# Patient Record
Sex: FEMALE | Race: WHITE | Employment: OTHER | URBAN - METROPOLITAN AREA
[De-identification: names, ages, dates, MRNs, and addresses within clinical notes are randomized per-mention and may not be internally consistent; named-entity substitution may affect disease eponyms.]

---

## 2017-04-19 ENCOUNTER — HOSPITAL ENCOUNTER (OUTPATIENT)
Dept: CT IMAGING | Age: 77
Discharge: HOME | End: 2017-04-19
Attending: NURSE PRACTITIONER | Admitting: NURSE PRACTITIONER

## 2017-04-19 DIAGNOSIS — C82.08 FOLLICULAR LYMPHOMA GRADE I OF LYMPH NODES OF MULTIPLE SITES (HCC): ICD-10-CM

## 2017-04-19 LAB
A/G RATIO: 1.2 (ref 1.1–2.2)
ALBUMIN SERPL-MCNC: 4 G/DL (ref 3.4–5)
ALP BLD-CCNC: 148 U/L (ref 40–129)
ALT SERPL-CCNC: 17 U/L (ref 10–40)
ANION GAP SERPL CALCULATED.3IONS-SCNC: 16 MMOL/L (ref 3–16)
AST SERPL-CCNC: 18 U/L (ref 15–37)
BASOPHILS ABSOLUTE: 0 K/UL (ref 0–0.2)
BASOPHILS RELATIVE PERCENT: 0.4 %
BILIRUB SERPL-MCNC: 0.3 MG/DL (ref 0–1)
BUN BLDV-MCNC: 31 MG/DL (ref 7–20)
CALCIUM SERPL-MCNC: 9.2 MG/DL (ref 8.3–10.6)
CHLORIDE BLD-SCNC: 101 MMOL/L (ref 99–110)
CO2: 26 MMOL/L (ref 21–32)
CREAT SERPL-MCNC: 1.3 MG/DL (ref 0.6–1.2)
EOSINOPHILS ABSOLUTE: 1.4 K/UL (ref 0–0.6)
EOSINOPHILS RELATIVE PERCENT: 16.8 %
GFR AFRICAN AMERICAN: 48
GFR NON-AFRICAN AMERICAN: 40
GLOBULIN: 3.4 G/DL
GLUCOSE BLD-MCNC: 63 MG/DL (ref 70–99)
HCT VFR BLD CALC: 37.2 % (ref 36–48)
HEMOGLOBIN: 12.3 G/DL (ref 12–16)
LACTATE DEHYDROGENASE: 168 U/L (ref 100–190)
LYMPHOCYTES ABSOLUTE: 1.7 K/UL (ref 1–5.1)
LYMPHOCYTES RELATIVE PERCENT: 21.4 %
MCH RBC QN AUTO: 30 PG (ref 26–34)
MCHC RBC AUTO-ENTMCNC: 33.1 G/DL (ref 31–36)
MCV RBC AUTO: 90.5 FL (ref 80–100)
MONOCYTES ABSOLUTE: 0.8 K/UL (ref 0–1.3)
MONOCYTES RELATIVE PERCENT: 10 %
NEUTROPHILS ABSOLUTE: 4.2 K/UL (ref 1.7–7.7)
NEUTROPHILS RELATIVE PERCENT: 51.4 %
PDW BLD-RTO: 15.2 % (ref 12.4–15.4)
PLATELET # BLD: 196 K/UL (ref 135–450)
PMV BLD AUTO: 8.6 FL (ref 5–10.5)
POTASSIUM SERPL-SCNC: 4.4 MMOL/L (ref 3.5–5.1)
RBC # BLD: 4.11 M/UL (ref 4–5.2)
SODIUM BLD-SCNC: 143 MMOL/L (ref 136–145)
TOTAL PROTEIN: 7.4 G/DL (ref 6.4–8.2)
WBC # BLD: 8.1 K/UL (ref 4–11)

## 2017-10-20 ENCOUNTER — HOSPITAL ENCOUNTER (OUTPATIENT)
Dept: CT IMAGING | Age: 77
Discharge: HOME | End: 2017-10-20
Attending: INTERNAL MEDICINE | Admitting: INTERNAL MEDICINE

## 2017-10-20 DIAGNOSIS — C82.08 FOLLICULAR LYMPHOMA GRADE I OF LYMPH NODES OF MULTIPLE SITES (HCC): ICD-10-CM

## 2017-10-20 LAB
CREAT SERPL-MCNC: 1.2 MG/DL (ref 0.6–1.2)
GFR AFRICAN AMERICAN: 52
GFR NON-AFRICAN AMERICAN: 43

## 2018-05-04 ENCOUNTER — HOSPITAL ENCOUNTER (OUTPATIENT)
Dept: CT IMAGING | Age: 78
Discharge: HOME | End: 2018-05-04
Attending: NURSE PRACTITIONER | Admitting: NURSE PRACTITIONER

## 2018-05-04 DIAGNOSIS — C82.00 FOLLICULAR LYMPHOMA GRADE I, UNSPECIFIED BODY REGION (HCC): ICD-10-CM

## 2018-05-04 LAB
BUN BLDV-MCNC: 22 MG/DL (ref 7–20)
CREAT SERPL-MCNC: 1.5 MG/DL (ref 0.6–1.2)
GFR AFRICAN AMERICAN: 40
GFR NON-AFRICAN AMERICAN: 33

## 2018-12-06 ENCOUNTER — HOSPITAL ENCOUNTER (OUTPATIENT)
Dept: CT IMAGING | Age: 78
Discharge: HOME | End: 2018-12-06
Payer: MEDICARE

## 2018-12-06 DIAGNOSIS — C82.00 FOLLICULAR LYMPHOMA GRADE I, UNSPECIFIED BODY REGION (HCC): ICD-10-CM

## 2018-12-06 LAB
A/G RATIO: 1.2 (ref 1.1–2.2)
ALBUMIN SERPL-MCNC: 3.8 G/DL (ref 3.4–5)
ALP BLD-CCNC: 167 U/L (ref 40–129)
ALT SERPL-CCNC: 23 U/L (ref 10–40)
ANION GAP SERPL CALCULATED.3IONS-SCNC: 10 MMOL/L (ref 3–16)
AST SERPL-CCNC: 24 U/L (ref 15–37)
BILIRUB SERPL-MCNC: 0.3 MG/DL (ref 0–1)
BUN BLDV-MCNC: 24 MG/DL (ref 7–20)
CALCIUM SERPL-MCNC: 9.1 MG/DL (ref 8.3–10.6)
CHLORIDE BLD-SCNC: 104 MMOL/L (ref 99–110)
CO2: 27 MMOL/L (ref 21–32)
CREAT SERPL-MCNC: 1.2 MG/DL (ref 0.6–1.2)
GFR AFRICAN AMERICAN: 52
GFR NON-AFRICAN AMERICAN: 43
GLOBULIN: 3.1 G/DL
GLUCOSE BLD-MCNC: 98 MG/DL (ref 70–99)
POTASSIUM SERPL-SCNC: 3.9 MMOL/L (ref 3.5–5.1)
SODIUM BLD-SCNC: 141 MMOL/L (ref 136–145)
TOTAL PROTEIN: 6.9 G/DL (ref 6.4–8.2)

## 2018-12-06 PROCEDURE — 71260 CT THORAX DX C+: CPT

## 2018-12-06 PROCEDURE — 36415 COLL VENOUS BLD VENIPUNCTURE: CPT

## 2018-12-06 PROCEDURE — 74177 CT ABD & PELVIS W/CONTRAST: CPT

## 2018-12-06 PROCEDURE — 80053 COMPREHEN METABOLIC PANEL: CPT

## 2018-12-06 PROCEDURE — 6360000004 HC RX CONTRAST MEDICATION: Performed by: INTERNAL MEDICINE

## 2018-12-06 RX ADMIN — IOPAMIDOL 75 ML: 755 INJECTION, SOLUTION INTRAVENOUS at 16:52

## 2019-03-02 ENCOUNTER — HOSPITAL ENCOUNTER (EMERGENCY)
Age: 79
Discharge: HOME | End: 2019-03-02
Attending: EMERGENCY MEDICINE
Payer: MEDICARE

## 2019-03-02 VITALS
SYSTOLIC BLOOD PRESSURE: 171 MMHG | RESPIRATION RATE: 16 BRPM | BODY MASS INDEX: 34.01 KG/M2 | DIASTOLIC BLOOD PRESSURE: 69 MMHG | OXYGEN SATURATION: 97 % | HEART RATE: 60 BPM | TEMPERATURE: 98 F | HEIGHT: 63 IN

## 2019-03-02 DIAGNOSIS — I10 ESSENTIAL HYPERTENSION: Primary | ICD-10-CM

## 2019-03-02 LAB
A/G RATIO: 1.1 (ref 1.1–2.2)
ALBUMIN SERPL-MCNC: 4 G/DL (ref 3.4–5)
ALP BLD-CCNC: 186 U/L (ref 40–129)
ALT SERPL-CCNC: 20 U/L (ref 10–40)
ANION GAP SERPL CALCULATED.3IONS-SCNC: 11 MMOL/L (ref 3–16)
AST SERPL-CCNC: 21 U/L (ref 15–37)
BACTERIA: ABNORMAL /HPF
BASOPHILS ABSOLUTE: 0.1 K/UL (ref 0–0.2)
BASOPHILS RELATIVE PERCENT: 0.9 %
BILIRUB SERPL-MCNC: 0.3 MG/DL (ref 0–1)
BILIRUBIN URINE: NEGATIVE
BLOOD, URINE: NEGATIVE
BUN BLDV-MCNC: 29 MG/DL (ref 7–20)
CALCIUM SERPL-MCNC: 8.9 MG/DL (ref 8.3–10.6)
CHLORIDE BLD-SCNC: 99 MMOL/L (ref 99–110)
CLARITY: CLEAR
CO2: 29 MMOL/L (ref 21–32)
COLOR: ABNORMAL
CREAT SERPL-MCNC: 1.5 MG/DL (ref 0.6–1.2)
EOSINOPHILS ABSOLUTE: 1.4 K/UL (ref 0–0.6)
EOSINOPHILS RELATIVE PERCENT: 18.6 %
EPITHELIAL CELLS, UA: ABNORMAL /HPF
GFR AFRICAN AMERICAN: 40
GFR NON-AFRICAN AMERICAN: 33
GLOBULIN: 3.5 G/DL
GLUCOSE BLD-MCNC: 238 MG/DL (ref 70–99)
GLUCOSE URINE: NEGATIVE MG/DL
HCT VFR BLD CALC: 36.7 % (ref 36–48)
HEMOGLOBIN: 12.1 G/DL (ref 12–16)
KETONES, URINE: NEGATIVE MG/DL
LEUKOCYTE ESTERASE, URINE: ABNORMAL
LYMPHOCYTES ABSOLUTE: 1.7 K/UL (ref 1–5.1)
LYMPHOCYTES RELATIVE PERCENT: 22.2 %
MCH RBC QN AUTO: 28.8 PG (ref 26–34)
MCHC RBC AUTO-ENTMCNC: 32.9 G/DL (ref 31–36)
MCV RBC AUTO: 87.4 FL (ref 80–100)
MICROSCOPIC EXAMINATION: YES
MONOCYTES ABSOLUTE: 1 K/UL (ref 0–1.3)
MONOCYTES RELATIVE PERCENT: 12.4 %
NEUTROPHILS ABSOLUTE: 3.5 K/UL (ref 1.7–7.7)
NEUTROPHILS RELATIVE PERCENT: 45.9 %
NITRITE, URINE: NEGATIVE
PDW BLD-RTO: 15 % (ref 12.4–15.4)
PH UA: 7 (ref 5–8)
PLATELET # BLD: 151 K/UL (ref 135–450)
PMV BLD AUTO: 9.4 FL (ref 5–10.5)
POTASSIUM REFLEX MAGNESIUM: 4.4 MMOL/L (ref 3.5–5.1)
PROTEIN UA: ABNORMAL MG/DL
RBC # BLD: 4.2 M/UL (ref 4–5.2)
RBC UA: ABNORMAL /HPF (ref 0–2)
RENAL EPITHELIAL, UA: ABNORMAL /HPF
SODIUM BLD-SCNC: 139 MMOL/L (ref 136–145)
SPECIFIC GRAVITY UA: <=1.005 (ref 1–1.03)
TOTAL PROTEIN: 7.5 G/DL (ref 6.4–8.2)
URINE REFLEX TO CULTURE: YES
URINE TYPE: ABNORMAL
UROBILINOGEN, URINE: 0.2 E.U./DL
WBC # BLD: 7.7 K/UL (ref 4–11)
WBC UA: ABNORMAL /HPF (ref 0–5)

## 2019-03-02 PROCEDURE — 80053 COMPREHEN METABOLIC PANEL: CPT

## 2019-03-02 PROCEDURE — 93005 ELECTROCARDIOGRAM TRACING: CPT | Performed by: PHYSICIAN ASSISTANT

## 2019-03-02 PROCEDURE — 99283 EMERGENCY DEPT VISIT LOW MDM: CPT

## 2019-03-02 PROCEDURE — 87086 URINE CULTURE/COLONY COUNT: CPT

## 2019-03-02 PROCEDURE — 6360000002 HC RX W HCPCS: Performed by: PHYSICIAN ASSISTANT

## 2019-03-02 PROCEDURE — 85025 COMPLETE CBC W/AUTO DIFF WBC: CPT

## 2019-03-02 PROCEDURE — 96374 THER/PROPH/DIAG INJ IV PUSH: CPT

## 2019-03-02 PROCEDURE — 81001 URINALYSIS AUTO W/SCOPE: CPT

## 2019-03-02 RX ORDER — HYDRALAZINE HYDROCHLORIDE 20 MG/ML
5 INJECTION INTRAMUSCULAR; INTRAVENOUS ONCE
Status: COMPLETED | OUTPATIENT
Start: 2019-03-02 | End: 2019-03-02

## 2019-03-02 RX ADMIN — HYDRALAZINE HYDROCHLORIDE 5 MG: 20 INJECTION INTRAMUSCULAR; INTRAVENOUS at 15:49

## 2019-03-02 ASSESSMENT — ENCOUNTER SYMPTOMS
BACK PAIN: 0
EYE REDNESS: 0
EYE PAIN: 0
ABDOMINAL PAIN: 0
COUGH: 0
CONSTIPATION: 0
RHINORRHEA: 0
SHORTNESS OF BREATH: 0
NAUSEA: 0
DIARRHEA: 0
FACIAL SWELLING: 0
CHEST TIGHTNESS: 0
SORE THROAT: 0

## 2019-03-03 LAB
EKG ATRIAL RATE: 64 BPM
EKG DIAGNOSIS: NORMAL
EKG P AXIS: 37 DEGREES
EKG P-R INTERVAL: 178 MS
EKG Q-T INTERVAL: 446 MS
EKG QRS DURATION: 138 MS
EKG QTC CALCULATION (BAZETT): 460 MS
EKG R AXIS: 10 DEGREES
EKG T AXIS: 77 DEGREES
EKG VENTRICULAR RATE: 64 BPM
URINE CULTURE, ROUTINE: NORMAL

## 2019-03-03 PROCEDURE — 93010 ELECTROCARDIOGRAM REPORT: CPT | Performed by: INTERNAL MEDICINE

## 2019-11-16 ENCOUNTER — HOSPITAL ENCOUNTER (OUTPATIENT)
Dept: CT IMAGING | Age: 79
Discharge: HOME | End: 2019-11-16
Payer: MEDICARE

## 2019-11-16 ENCOUNTER — HOSPITAL ENCOUNTER (OUTPATIENT)
Age: 79
Discharge: HOME | End: 2019-11-16
Payer: MEDICARE

## 2019-11-16 DIAGNOSIS — C82.08 FOLLICULAR LYMPHOMA GRADE I OF LYMPH NODES OF MULTIPLE SITES (HCC): ICD-10-CM

## 2019-11-16 LAB
BUN BLDV-MCNC: 27 MG/DL (ref 7–20)
CREAT SERPL-MCNC: 1.4 MG/DL (ref 0.6–1.2)
GFR AFRICAN AMERICAN: 44
GFR NON-AFRICAN AMERICAN: 36

## 2019-11-16 PROCEDURE — 6360000004 HC RX CONTRAST MEDICATION: Performed by: INTERNAL MEDICINE

## 2019-11-16 PROCEDURE — 36415 COLL VENOUS BLD VENIPUNCTURE: CPT

## 2019-11-16 PROCEDURE — 74176 CT ABD & PELVIS W/O CONTRAST: CPT

## 2019-11-16 PROCEDURE — 84520 ASSAY OF UREA NITROGEN: CPT

## 2019-11-16 PROCEDURE — 82565 ASSAY OF CREATININE: CPT

## 2019-11-16 RX ADMIN — IOHEXOL 50 ML: 240 INJECTION, SOLUTION INTRATHECAL; INTRAVASCULAR; INTRAVENOUS; ORAL at 07:50

## 2020-01-01 ENCOUNTER — HOSPITAL ENCOUNTER (OUTPATIENT)
Dept: CT IMAGING | Age: 80
Discharge: HOME | End: 2020-07-17
Payer: MEDICARE

## 2020-01-01 ENCOUNTER — HOSPITAL ENCOUNTER (EMERGENCY)
Age: 80
End: 2020-12-29
Attending: STUDENT IN AN ORGANIZED HEALTH CARE EDUCATION/TRAINING PROGRAM
Payer: MEDICARE

## 2020-01-01 ENCOUNTER — HOSPITAL ENCOUNTER (OUTPATIENT)
Age: 80
Discharge: HOME | End: 2020-07-31
Payer: MEDICARE

## 2020-01-01 ENCOUNTER — APPOINTMENT (OUTPATIENT)
Dept: GENERAL RADIOLOGY | Age: 80
End: 2020-01-01
Payer: MEDICARE

## 2020-01-01 ENCOUNTER — HOSPITAL ENCOUNTER (OUTPATIENT)
Age: 80
Discharge: HOME | End: 2020-07-17
Payer: MEDICARE

## 2020-01-01 ENCOUNTER — HOSPITAL ENCOUNTER (OUTPATIENT)
Dept: CT IMAGING | Age: 80
Discharge: HOME | End: 2020-04-16
Payer: MEDICARE

## 2020-01-01 VITALS
OXYGEN SATURATION: 78 % | RESPIRATION RATE: 17 BRPM | HEART RATE: 93 BPM | WEIGHT: 220 LBS | SYSTOLIC BLOOD PRESSURE: 138 MMHG | DIASTOLIC BLOOD PRESSURE: 59 MMHG

## 2020-01-01 LAB
A/G RATIO: 1.2 (ref 1.1–2.2)
ALBUMIN SERPL-MCNC: 2.9 G/DL (ref 3.4–5)
ALBUMIN SERPL-MCNC: 3.4 G/DL (ref 3.1–4.9)
ALBUMIN SERPL-MCNC: 4 G/DL (ref 3.4–5)
ALP BLD-CCNC: 188 U/L (ref 40–129)
ALPHA-1-GLOBULIN: 0.3 G/DL (ref 0.2–0.4)
ALPHA-2-GLOBULIN: 1.1 G/DL (ref 0.4–1.1)
ALT SERPL-CCNC: 71 U/L (ref 10–40)
ANION GAP SERPL CALCULATED.3IONS-SCNC: 16 MMOL/L (ref 3–16)
ANION GAP SERPL CALCULATED.3IONS-SCNC: 17 MMOL/L (ref 3–16)
ANISOCYTOSIS: ABNORMAL
ANTI-NUCLEAR ANTIBODY (ANA): NEGATIVE
AST SERPL-CCNC: 138 U/L (ref 15–37)
ATYPICAL LYMPHOCYTE RELATIVE PERCENT: 14 % (ref 0–6)
BANDED NEUTROPHILS RELATIVE PERCENT: 3 % (ref 0–7)
BASE EXCESS VENOUS: -22.6 MMOL/L (ref -3–3)
BASOPHILS ABSOLUTE: 0.2 K/UL (ref 0–0.2)
BASOPHILS RELATIVE PERCENT: 1 %
BETA GLOBULIN: 1 G/DL (ref 0.9–1.6)
BILIRUB SERPL-MCNC: <0.2 MG/DL (ref 0–1)
BUN BLDV-MCNC: 24 MG/DL (ref 7–20)
BUN BLDV-MCNC: 31 MG/DL (ref 7–20)
BUN BLDV-MCNC: 36 MG/DL (ref 7–20)
C3 COMPLEMENT: 141.6 MG/DL (ref 90–180)
C4 COMPLEMENT: 28.8 MG/DL (ref 10–40)
CALCIUM SERPL-MCNC: 9.2 MG/DL (ref 8.3–10.6)
CALCIUM SERPL-MCNC: 9.9 MG/DL (ref 8.3–10.6)
CARBOXYHEMOGLOBIN: 0.8 % (ref 0–1.5)
CHLORIDE BLD-SCNC: 98 MMOL/L (ref 99–110)
CHLORIDE BLD-SCNC: 98 MMOL/L (ref 99–110)
CO2: 20 MMOL/L (ref 21–32)
CO2: 25 MMOL/L (ref 21–32)
CREAT SERPL-MCNC: 1.6 MG/DL (ref 0.6–1.2)
CREAT SERPL-MCNC: 1.8 MG/DL (ref 0.6–1.2)
CREAT SERPL-MCNC: 1.8 MG/DL (ref 0.6–1.2)
D DIMER: 3669 NG/ML DDU (ref 0–229)
EOSINOPHILS ABSOLUTE: 0.7 K/UL (ref 0–0.6)
EOSINOPHILS RELATIVE PERCENT: 4 %
GAMMA GLOBULIN: 1.3 G/DL (ref 0.6–1.8)
GFR AFRICAN AMERICAN: 33
GFR AFRICAN AMERICAN: 33
GFR AFRICAN AMERICAN: 37
GFR AFRICAN AMERICAN: 37
GFR NON-AFRICAN AMERICAN: 27
GFR NON-AFRICAN AMERICAN: 27
GFR NON-AFRICAN AMERICAN: 31
GFR NON-AFRICAN AMERICAN: 31
GLOBULIN: 2.4 G/DL
GLUCOSE BLD-MCNC: 191 MG/DL (ref 70–99)
GLUCOSE BLD-MCNC: 439 MG/DL (ref 70–99)
HCO3 VENOUS: 13.2 MMOL/L (ref 23–29)
HCT VFR BLD CALC: 40.9 % (ref 36–48)
HEMATOLOGY PATH CONSULT: YES
HEMOGLOBIN: 12.4 G/DL (ref 12–16)
KAPPA, FREE LIGHT CHAINS, SERUM: 69.61 MG/L (ref 3.3–19.4)
KAPPA/LAMBDA RATIO: 1.39 (ref 0.26–1.65)
KAPPA/LAMBDA TEST COMMENT: ABNORMAL
LAMBDA, FREE LIGHT CHAINS, SERUM: 50.18 MG/L (ref 5.71–26.3)
LYMPHOCYTES ABSOLUTE: 12.7 K/UL (ref 1–5.1)
LYMPHOCYTES RELATIVE PERCENT: 56 %
MCH RBC QN AUTO: 29.2 PG (ref 26–34)
MCHC RBC AUTO-ENTMCNC: 30.2 G/DL (ref 31–36)
MCV RBC AUTO: 96.7 FL (ref 80–100)
METAMYELOCYTES RELATIVE PERCENT: 1 %
METHEMOGLOBIN VENOUS: 1.1 %
MONOCYTES ABSOLUTE: 0.5 K/UL (ref 0–1.3)
MONOCYTES RELATIVE PERCENT: 3 %
NEUTROPHILS ABSOLUTE: 4 K/UL (ref 1.7–7.7)
NEUTROPHILS RELATIVE PERCENT: 18 %
O2 CONTENT, VEN: 6 VOL %
O2 SAT, VEN: 34 %
O2 THERAPY: ABNORMAL
PARATHYROID HORMONE INTACT: 80.9 PG/ML (ref 14–72)
PCO2, VEN: 90.7 MMHG (ref 40–50)
PDW BLD-RTO: 15.1 % (ref 12.4–15.4)
PERFORMED ON: ABNORMAL
PH VENOUS: 6.78 (ref 7.35–7.45)
PHOSPHORUS: 4.2 MG/DL (ref 2.5–4.9)
PLATELET # BLD: 123 K/UL (ref 135–450)
PLATELET SLIDE REVIEW: ABNORMAL
PMV BLD AUTO: 10 FL (ref 5–10.5)
PO2, VEN: 38.1 MMHG (ref 25–40)
POC CREATININE: 1.6 MG/DL (ref 0.6–1.2)
POC SAMPLE TYPE: ABNORMAL
POIKILOCYTES: ABNORMAL
POLYCHROMASIA: ABNORMAL
POTASSIUM REFLEX MAGNESIUM: 4.2 MMOL/L (ref 3.5–5.1)
POTASSIUM SERPL-SCNC: 4.4 MMOL/L (ref 3.5–5.1)
RBC # BLD: 4.23 M/UL (ref 4–5.2)
SARS-COV-2, PCR: NOT DETECTED
SLIDE REVIEW: ABNORMAL
SODIUM BLD-SCNC: 134 MMOL/L (ref 136–145)
SODIUM BLD-SCNC: 140 MMOL/L (ref 136–145)
SPE/IFE INTERPRETATION: NORMAL
TCO2 CALC VENOUS: 16 MMOL/L
TOTAL PROTEIN: 5.3 G/DL (ref 6.4–8.2)
TOTAL PROTEIN: 7 G/DL (ref 6.4–8.2)
TROPONIN: <0.01 NG/ML
VITAMIN D 25-HYDROXY: 15.8 NG/ML
WBC # BLD: 18.2 K/UL (ref 4–11)

## 2020-01-01 PROCEDURE — 82803 BLOOD GASES ANY COMBINATION: CPT

## 2020-01-01 PROCEDURE — 82565 ASSAY OF CREATININE: CPT

## 2020-01-01 PROCEDURE — 86160 COMPLEMENT ANTIGEN: CPT

## 2020-01-01 PROCEDURE — 2500000003 HC RX 250 WO HCPCS: Performed by: STUDENT IN AN ORGANIZED HEALTH CARE EDUCATION/TRAINING PROGRAM

## 2020-01-01 PROCEDURE — 99285 EMERGENCY DEPT VISIT HI MDM: CPT

## 2020-01-01 PROCEDURE — U0003 INFECTIOUS AGENT DETECTION BY NUCLEIC ACID (DNA OR RNA); SEVERE ACUTE RESPIRATORY SYNDROME CORONAVIRUS 2 (SARS-COV-2) (CORONAVIRUS DISEASE [COVID-19]), AMPLIFIED PROBE TECHNIQUE, MAKING USE OF HIGH THROUGHPUT TECHNOLOGIES AS DESCRIBED BY CMS-2020-01-R: HCPCS

## 2020-01-01 PROCEDURE — 85025 COMPLETE CBC W/AUTO DIFF WBC: CPT

## 2020-01-01 PROCEDURE — 6360000002 HC RX W HCPCS: Performed by: STUDENT IN AN ORGANIZED HEALTH CARE EDUCATION/TRAINING PROGRAM

## 2020-01-01 PROCEDURE — 93005 ELECTROCARDIOGRAM TRACING: CPT

## 2020-01-01 PROCEDURE — 36415 COLL VENOUS BLD VENIPUNCTURE: CPT

## 2020-01-01 PROCEDURE — 93010 ELECTROCARDIOGRAM REPORT: CPT | Performed by: INTERNAL MEDICINE

## 2020-01-01 PROCEDURE — 2580000003 HC RX 258: Performed by: STUDENT IN AN ORGANIZED HEALTH CARE EDUCATION/TRAINING PROGRAM

## 2020-01-01 PROCEDURE — U0002 COVID-19 LAB TEST NON-CDC: HCPCS

## 2020-01-01 PROCEDURE — 6360000004 HC RX CONTRAST MEDICATION: Performed by: NURSE PRACTITIONER

## 2020-01-01 PROCEDURE — 80069 RENAL FUNCTION PANEL: CPT

## 2020-01-01 PROCEDURE — 84484 ASSAY OF TROPONIN QUANT: CPT

## 2020-01-01 PROCEDURE — 86038 ANTINUCLEAR ANTIBODIES: CPT

## 2020-01-01 PROCEDURE — 82306 VITAMIN D 25 HYDROXY: CPT

## 2020-01-01 PROCEDURE — 71045 X-RAY EXAM CHEST 1 VIEW: CPT

## 2020-01-01 PROCEDURE — 31500 INSERT EMERGENCY AIRWAY: CPT

## 2020-01-01 PROCEDURE — 80053 COMPREHEN METABOLIC PANEL: CPT

## 2020-01-01 PROCEDURE — 74176 CT ABD & PELVIS W/O CONTRAST: CPT

## 2020-01-01 PROCEDURE — 85379 FIBRIN DEGRADATION QUANT: CPT

## 2020-01-01 PROCEDURE — 84165 PROTEIN E-PHORESIS SERUM: CPT

## 2020-01-01 PROCEDURE — 96374 THER/PROPH/DIAG INJ IV PUSH: CPT

## 2020-01-01 PROCEDURE — 84155 ASSAY OF PROTEIN SERUM: CPT

## 2020-01-01 PROCEDURE — 93005 ELECTROCARDIOGRAM TRACING: CPT | Performed by: STUDENT IN AN ORGANIZED HEALTH CARE EDUCATION/TRAINING PROGRAM

## 2020-01-01 PROCEDURE — 84520 ASSAY OF UREA NITROGEN: CPT

## 2020-01-01 PROCEDURE — 83970 ASSAY OF PARATHORMONE: CPT

## 2020-01-01 PROCEDURE — 83883 ASSAY NEPHELOMETRY NOT SPEC: CPT

## 2020-01-01 PROCEDURE — 86334 IMMUNOFIX E-PHORESIS SERUM: CPT

## 2020-01-01 RX ORDER — EPINEPHRINE 0.1 MG/ML
SYRINGE (ML) INJECTION DAILY PRN
Status: COMPLETED | OUTPATIENT
Start: 2020-01-01 | End: 2020-01-01

## 2020-01-01 RX ORDER — PROPOFOL 10 MG/ML
10 INJECTION, EMULSION INTRAVENOUS
Status: DISCONTINUED | OUTPATIENT
Start: 2020-01-01 | End: 2020-01-01

## 2020-01-01 RX ORDER — MORPHINE SULFATE 4 MG/ML
4 INJECTION, SOLUTION INTRAMUSCULAR; INTRAVENOUS
Status: CANCELLED | OUTPATIENT
Start: 2020-12-31 | End: 2021-01-02

## 2020-01-01 RX ORDER — SODIUM CHLORIDE 0.9 % (FLUSH) 0.9 %
10 SYRINGE (ML) INJECTION EVERY 12 HOURS SCHEDULED
Status: CANCELLED | OUTPATIENT
Start: 2020-01-01

## 2020-01-01 RX ORDER — ACETAMINOPHEN 650 MG/1
650 SUPPOSITORY RECTAL EVERY 6 HOURS PRN
Status: CANCELLED | OUTPATIENT
Start: 2020-01-01

## 2020-01-01 RX ORDER — NALOXONE HYDROCHLORIDE 1 MG/ML
INJECTION INTRAMUSCULAR; INTRAVENOUS; SUBCUTANEOUS DAILY PRN
Status: COMPLETED | OUTPATIENT
Start: 2020-01-01 | End: 2020-01-01

## 2020-01-01 RX ORDER — SODIUM CHLORIDE 0.9 % (FLUSH) 0.9 %
10 SYRINGE (ML) INJECTION PRN
Status: CANCELLED | OUTPATIENT
Start: 2020-01-01

## 2020-01-01 RX ORDER — CALCIUM CHLORIDE 100 MG/ML
INJECTION INTRAVENOUS; INTRAVENTRICULAR DAILY PRN
Status: COMPLETED | OUTPATIENT
Start: 2020-01-01 | End: 2020-01-01

## 2020-01-01 RX ORDER — ACETAMINOPHEN 325 MG/1
650 TABLET ORAL EVERY 6 HOURS PRN
Status: CANCELLED | OUTPATIENT
Start: 2020-01-01

## 2020-01-01 RX ORDER — MORPHINE SULFATE 4 MG/ML
4 INJECTION, SOLUTION INTRAMUSCULAR; INTRAVENOUS ONCE
Status: COMPLETED | OUTPATIENT
Start: 2020-01-01 | End: 2020-01-01

## 2020-01-01 RX ORDER — MORPHINE SULFATE 2 MG/ML
2 INJECTION, SOLUTION INTRAMUSCULAR; INTRAVENOUS
Status: CANCELLED | OUTPATIENT
Start: 2020-01-01 | End: 2020-12-31

## 2020-01-01 RX ORDER — 0.9 % SODIUM CHLORIDE 0.9 %
1000 INTRAVENOUS SOLUTION INTRAVENOUS ONCE
Status: COMPLETED | OUTPATIENT
Start: 2020-01-01 | End: 2020-01-01

## 2020-01-01 RX ORDER — PROPOFOL 10 MG/ML
INJECTION, EMULSION INTRAVENOUS
Status: DISCONTINUED
Start: 2020-01-01 | End: 2020-01-01 | Stop reason: HOSPADM

## 2020-01-01 RX ORDER — LORAZEPAM 2 MG/ML
1 INJECTION INTRAMUSCULAR EVERY 4 HOURS PRN
Status: CANCELLED | OUTPATIENT
Start: 2020-01-01 | End: 2020-12-31

## 2020-01-01 RX ADMIN — CALCIUM CHLORIDE 1 G: 100 INJECTION, SOLUTION INTRAVENOUS; INTRAVENTRICULAR at 11:08

## 2020-01-01 RX ADMIN — IOHEXOL 50 ML: 240 INJECTION, SOLUTION INTRATHECAL; INTRAVASCULAR; INTRAVENOUS; ORAL at 12:17

## 2020-01-01 RX ADMIN — EPINEPHRINE 1 MG: 0.1 INJECTION, SOLUTION ENDOTRACHEAL; INTRACARDIAC; INTRAVENOUS at 11:10

## 2020-01-01 RX ADMIN — NALOXONE HYDROCHLORIDE 2 MG: 1 INJECTION PARENTERAL at 11:18

## 2020-01-01 RX ADMIN — EPINEPHRINE 1 MG: 0.1 INJECTION, SOLUTION ENDOTRACHEAL; INTRACARDIAC; INTRAVENOUS at 11:06

## 2020-01-01 RX ADMIN — MORPHINE SULFATE 4 MG: 4 INJECTION INTRAVENOUS at 15:14

## 2020-01-01 RX ADMIN — EPINEPHRINE 1 MCG/MIN: 1 INJECTION INTRAMUSCULAR; INTRAVENOUS; SUBCUTANEOUS at 11:48

## 2020-01-01 RX ADMIN — SODIUM CHLORIDE 1000 ML: 9 INJECTION, SOLUTION INTRAVENOUS at 12:14

## 2020-01-01 RX ADMIN — EPINEPHRINE 1 MG: 0.1 INJECTION, SOLUTION ENDOTRACHEAL; INTRACARDIAC; INTRAVENOUS at 11:14

## 2020-01-01 RX ADMIN — SODIUM BICARBONATE 50 MEQ: 84 INJECTION, SOLUTION INTRAVENOUS at 11:07

## 2020-01-01 RX ADMIN — EPINEPHRINE 1 MG: 0.1 INJECTION, SOLUTION ENDOTRACHEAL; INTRACARDIAC; INTRAVENOUS at 11:18

## 2020-01-01 RX ADMIN — IOHEXOL 50 ML: 240 INJECTION, SOLUTION INTRATHECAL; INTRAVASCULAR; INTRAVENOUS; ORAL at 12:25

## 2020-01-01 RX ADMIN — CALCIUM CHLORIDE 1 G: 100 INJECTION, SOLUTION INTRAVENOUS; INTRAVENTRICULAR at 11:24

## 2020-01-01 RX ADMIN — SODIUM BICARBONATE 50 MEQ: 84 INJECTION, SOLUTION INTRAVENOUS at 11:23

## 2020-01-01 RX ADMIN — SODIUM BICARBONATE: 84 INJECTION, SOLUTION INTRAVENOUS at 13:38

## 2020-01-01 RX ADMIN — EPINEPHRINE 1 MG: 0.1 INJECTION, SOLUTION ENDOTRACHEAL; INTRACARDIAC; INTRAVENOUS at 11:22

## 2020-01-01 ASSESSMENT — PAIN SCALES - GENERAL: PAINLEVEL_OUTOF10: 8

## 2020-12-29 PROBLEM — I46.9 CARDIAC ARREST (HCC): Status: ACTIVE | Noted: 2020-01-01

## 2020-12-29 NOTE — PLAN OF CARE
Patient was called for admission and admission orders placed but patient passed quickly after orders entered and before patient was brought to the floor. Admission was cancelled. Please see ER notes for specific information regarding TOD.      Kaleb Gomez PA-C  12/29/2020 4:04 PM

## 2020-12-29 NOTE — ED PROVIDER NOTES
 Financial resource strain: Not on file    Food insecurity     Worry: Not on file     Inability: Not on file   [employer] needs     Medical: Not on file     Non-medical: Not on file   Tobacco Use    Smoking status: Not on file   Substance and Sexual Activity    Alcohol use: Not on file    Drug use: Not on file    Sexual activity: Not on file   Lifestyle    Physical activity     Days per week: Not on file     Minutes per session: Not on file    Stress: Not on file   Relationships    Social connections     Talks on phone: Not on file     Gets together: Not on file     Attends [employer] service: Not on file     Active member of club or organization: Not on file     Attends meetings of clubs or organizations: Not on file     Relationship status: Not on file    Intimate partner violence     Fear of current or ex partner: Not on file     Emotionally abused: Not on file     Physically abused: Not on file     Forced sexual activity: Not on file   Other Topics Concern    Not on file   Social History Narrative    Not on file     Current Facility-Administered Medications   Medication Dose Route Frequency Provider Last Rate Last Admin    propofol 1000 MG/100ML injection             sodium bicarbonate 75 mEq in dextrose 5 % 1,000 mL infusion   Intravenous Continuous Apryl Madsen MD   Stopped at 12/29/20 1512     No current outpatient medications on file. Not on File    REVIEW OF SYSTEMS  10 systems reviewed, pertinent positives per HPI otherwise noted to be negative. PHYSICAL EXAM  BP (!) 138/59   Pulse 93   Resp 17   Wt 220 lb (99.8 kg)   SpO2 (!) 78%    GENERAL APPEARANCE: Unresponsive  HENT: Normocephalic. Atraumatic. LMA in place  NECK: Supple. EYES: Pupils dilated, mild reactive  HEART/CHEST: Florentino device in place  LUNGS: LMA in place, breath sounds bilaterally with bagging. ABDOMEN: Soft. Non-distended. No masses. No organomegaly. No guarding or rebound.    MUSCULOSKELETAL: No extremity edema. Compartments soft. No deformity. SKIN: Warm and dry. No acute rashes. NEUROLOGICAL: Unresponsive  PSYCHIATRIC: Unresponsive    LABS  I have reviewed all labs for this visit.    Results for orders placed or performed during the hospital encounter of 12/29/20   CBC auto differential   Result Value Ref Range    WBC 18.2 (H) 4.0 - 11.0 K/uL    RBC 4.23 4.00 - 5.20 M/uL    Hemoglobin 12.4 12.0 - 16.0 g/dL    Hematocrit 40.9 36.0 - 48.0 %    MCV 96.7 80.0 - 100.0 fL    MCH 29.2 26.0 - 34.0 pg    MCHC 30.2 (L) 31.0 - 36.0 g/dL    RDW 15.1 12.4 - 15.4 %    Platelets 599 (L) 745 - 450 K/uL    MPV 10.0 5.0 - 10.5 fL    PLATELET SLIDE REVIEW Decreased     SLIDE REVIEW see below     Path Consult Yes     Neutrophils % 18.0 %    Lymphocytes % 56.0 %    Monocytes % 3.0 %    Eosinophils % 4.0 %    Basophils % 1.0 %    Neutrophils Absolute 4.0 1.7 - 7.7 K/uL    Lymphocytes Absolute 12.7 (H) 1.0 - 5.1 K/uL    Monocytes Absolute 0.5 0.0 - 1.3 K/uL    Eosinophils Absolute 0.7 (H) 0.0 - 0.6 K/uL    Basophils Absolute 0.2 0.0 - 0.2 K/uL    Bands Relative 3 0 - 7 %    Atypical Lymphocytes Relative 14 (H) 0 - 6 %    Metamyelocytes Relative 1 (A) %    Anisocytosis Occasional (A)     Polychromasia Occasional (A)     Poikilocytes Occasional (A)    Blood gas, venous   Result Value Ref Range    pH, [place] 6.781 (LL) 7.350 - 7.450    pCO2, [place] 90.7 (H) 40.0 - 50.0 mmHg    pO2, [place] 38.1 25.0 - 40.0 mmHg    HCO3, Venous 13.2 (L) 23.0 - 29.0 mmol/L    Base Excess, [place] -22.6 (L) -3.0 - 3.0 mmol/L    O2 Sat, [place] 34 Not Established %    Carboxyhemoglobin 0.8 0.0 - 1.5 %    MetHgb, [place] 1.1 <1.5 %    TC02 (Calc), [place] 16 Not Established mmol/L    O2 Content, [place] 6 Not Established VOL %    O2 Therapy Unknown    Troponin   Result Value Ref Range    Troponin <0.01 <0.01 ng/mL   Comprehensive Metabolic Panel w/ Reflex to MG   Result Value Ref Range    Sodium 134 (L) 136 - 145 mmol/L    Potassium reflex Magnesium 4.2 3.5 - 5.1 mmol/L Chloride 98 (L) 99 - 110 mmol/L    CO2 20 (L) 21 - 32 mmol/L    Anion Gap 16 3 - 16    Glucose 439 (H) 70 - 99 mg/dL    BUN 24 (H) 7 - 20 mg/dL    CREATININE 1.8 (H) 0.6 - 1.2 mg/dL    GFR Non-African American 27 (A) >60    GFR  33 (A) >60    Calcium 9.9 8.3 - 10.6 mg/dL    Total Protein 5.3 (L) 6.4 - 8.2 g/dL    Alb 2.9 (L) 3.4 - 5.0 g/dL    Albumin/Globulin Ratio 1.2 1.1 - 2.2    Total Bilirubin <0.2 0.0 - 1.0 mg/dL    Alkaline Phosphatase 188 (H) 40 - 129 U/L    ALT 71 (H) 10 - 40 U/L     (H) 15 - 37 U/L    Globulin 2.4 g/dL   D-dimer, quantitative   Result Value Ref Range    D-Dimer, Quant 3669 (H) 0 - 229 ng/mL DDU   EKG 12 Lead   Result Value Ref Range    Ventricular Rate 74 BPM    Atrial Rate 74 BPM    P-R Interval 168 ms    QRS Duration 132 ms    Q-T Interval 400 ms    QTc Calculation (Bazett) 444 ms    P Axis 93 degrees    R Axis -1 degrees    T Axis 140 degrees    Diagnosis       Sinus rhythm with marked sinus arrhythmiaNon-specific intra-ventricular conduction blockCannot rule out Septal infarct , age undeterminedT wave abnormality, consider lateral ischemiaAbnormal ECGNo previous ECGs available   EKG 12 Lead   Result Value Ref Range    Ventricular Rate 125 BPM    Atrial Rate 125 BPM    P-R Interval 198 ms    QRS Duration 138 ms    Q-T Interval 292 ms    QTc Calculation (Bazett) 421 ms    P Axis 42 degrees    R Axis 14 degrees    T Axis 150 degrees    Diagnosis       Sinus tachycardia with Premature supraventricular complexes and with occasional Premature ventricular complexesPossible Left atrial enlargementLeft bundle branch blockAbnormal ECGWhen compared with ECG of 29-DEC-2020 11:45, (unconfirmed)Premature ventricular complexes are now PresentPremature supraventricular complexes are now PresentVent.  rate has increased BY  51 BPM       ECG  The Ekg interpreted by me shows  sinus arrhythmia with a rate of 74  Axis is   Left axis deviation  QTc is  within an acceptable confirmed using a colorimetric end-tidal CO2 detector, tube condensation and chest auscultation/visualization. Respiratory therapy is at the bedside and is assisting with ventilatory management. ED COURSE / MDM  Patient seen and evaluated. Old records reviewed. Labs and imaging reviewed and results discussed with patient. Patient arrives in PEA arrest.  Downtime prior to arrival was approximately 30 minutes. Differential diagnosis includes but is not limited to: Cardiac arrest, hypoglycemia, PE, MI, hypovolemia, hyperkalemia, acidosis, toxins    During the patient's ED course, the patient was given:  Medications   propofol 1000 MG/100ML injection (has no administration in time range)   sodium bicarbonate 75 mEq in dextrose 5 % 1,000 mL infusion ( Intravenous Stopped 12/29/20 1520)   EPINEPHrine 1 MG/10ML injection (1 mg Intraosseous Given 12/29/20 1114)   sodium bicarbonate 8.4 % injection (50 mEq Intravenous Given 12/29/20 1107)   calcium chloride 10 % injection (1 g Intravenous Given 12/29/20 1108)   naloxone (NARCAN) injection (2 mg Intraosseous Given 12/29/20 1118)   EPINEPHrine 1 MG/10ML injection (1 mg Intraosseous Given 12/29/20 1122)   sodium bicarbonate 8.4 % injection (50 mEq Intravenous Given 12/29/20 1123)   calcium chloride 10 % injection (1 g Intravenous Given 12/29/20 1124)   0.9 % sodium chloride bolus (1,000 mLs Intravenous Given by Other Clinician 12/29/20 1214)   morphine sulfate (PF) injection 4 mg (4 mg Intravenous Given 12/29/20 1514)      Patient arrived in PEA arrest.  Patient had had evidence of V. tach and had received defibrillation. At the time of arrival, patient had had downtime approximately 30 minutes. Patient had had intermittent ROSC. LMA was in place and this was switched to an ET tube. Patient continued to receive rounds of epinephrine and also received bicarb and calcium gluconate. She also received a dose of Narcan.      Had conversation with family at the bedside. After approximately 30 minutes of resuscitation in the emergency department, family was ready to end the code. At that time on the last pulse check, ROSC had been obtained. At that time, patient was DNR CCA. Patient placed on epinephrine drip. Epinephrine drip was eventually discontinued due to tachycardia and hypertension. The cause of arrest is unknown.    -Patient had reported shortness of breath prior to going unresponsive. On arrival, LMA was in place, this was replaced with an ET tube. -Bilateral breath sounds, low suspicion for tension pneumothorax. Post code x-ray did not show evidence of pneumothorax.  -Bedside ultrasound did not show evidence of cardiac tamponade  -Patient had no reported history of trauma, there was no evidence of trauma on exam.  Patient does not appear dehydrated. Low suspicion for hypovolemia as the cause of patient's cardiac arrest.  -She is not hypoglycemic  -There is no history of cold exposure, low suspicion for hypothermia as the cause of arrest. patient skin is warm to the touch.  -No reported history of drug use. Patient received a dose of Narcan.  -Patient did receive bicarb as part of initial resuscitation. Her laboratory studies did show significant acidemia. Patient was placed on a bicarb drip. Family withdrew care prior to repeat blood gas. -She has no history of kidney disease. Lower suspicion for hyperkalemia, however patient did receive calcium during code. Potassium was within normal limits on laboratory studies. Initial EKG after resuscitation did show some questionable ST elevations. Did discuss with cardiology who stated that patient has known left bundle branch block and they did not have acute concern for STEMI at that time. Repeat EKG did show possible worsening of ST elevations, did discuss again with cardiology who had lower suspicion for STEMI. However, they did state that empiric heparin would not be unreasonable.   Initial troponin within normal limits. Plan was for redraw, however family withdrew care prior to second draw. I did also have concern for pulmonary embolism. Patient has a history of malignancy. There is no evidence of DVT on exam.  D-dimer obtained and is significantly elevated. Did attempt to obtain CT PE study, however GFR is 27 so CT scan stated they were unable to complete the study. Plan was to obtain head CT in order to confirm there is no intracranial hemorrhage and to then potentially start empiric heparin for possible MI versus PE. Family was concerned about coronavirus. Do feel that testing is appropriate for family to know. Patient does have transaminitis and mildly worsening of acute kidney injury which is suggestive of possible coronavirus infection. Coronavirus testing pending at this time. Had goals of care discussion with family. After initial resuscitation, patient was made DNR CCA. They stated they did not want any invasive interventions, they declined central line. They stated that they only wanted things that would keep the patient alive long enough to have family arrive to say goodbye and then they plan to make the patient DNR CC and palliatively extubate. Given the family wishes was to the patient alive long enough for family to arrive at the bedside, did feel that I wanted to confirm there was a large head bleed that would be made worsened prior to heparin administration. Family arrived relatively quickly. They saw the patient and changed CODE STATUS to DNR CC with plan to palliatively extubate. Patient had not had CT imaging at that time. Did discuss process of palliative extubation with Dr. Jamal Fajardo of critical care to confirm appropriate process completed. Propofol was discontinued. Patient was given a dose of morphine to prevent air hunger. Patient was palliatively extubated. Patient had spontaneous respirations for approximately 25 minutes.   After approximately 15 minutes, did attempt to proceed forward with admission as I did not know how long patient would have spontaneous respirations. However, I was then called back to the bedside approximately 10 minutes later. Patient had lost pulses. There was initially a couple shallow agonal breaths which ceased. Patient's pupils were fixed and dilated. No cardiac sounds or respirations heard on auscultation of the chest.  Time of death pronounced at 1550. I spent a total of 120 minutes of critical care time in obtaining history, performing a physical exam, bedside monitoring of interventions, collecting and interpreting tests and discussion with consultants but not including time spent performing procedures. Clinical Concern cardiac arrest, MI, PE    Intervention ACLS medications, epinephrine drip, consultation with critical care and cardiology, frequent rechecks        CLINICAL IMPRESSION  1. Cardiac arrest ([facility] [address]        Blood pressure (!) 138/59, pulse 93, resp. rate 17, weight 220 lb (99.8 kg), SpO2 (!) 78 %. DISPOSITION  Rickey Temple is       DISCLAIMER: This chart was created using Dragon dictation software. Efforts were made by me to ensure accuracy, however some errors may be present due to limitations of this technology and occasionally words are not transcribed correctly.         Shana Garza MD  20 1029

## 2020-12-29 NOTE — FLOWSHEET NOTE
20 1639   Encounter Summary   Continue Visiting   (: patient passed,  home [id number]   Grief and Life Adjustment   Type Death   Assessment Approachable;Calm;Peaceful

## 2020-12-29 NOTE — Clinical Note
Patient Class: Inpatient [101]   REQUIRED: Diagnosis: Cardiac arrest ([facility] [address]. ICD-9-CM]   Estimated Length of Stay: Estimated stay of more than 2 midnights   Telemetry Bed Required?: No

## 2020-12-29 NOTE — PROGRESS NOTES
Had CODE STATUS conversation with patient's  and family. Patient's CODE STATUS at this time is DNR CCA. They also do not wish to have any invasive procedures such as a central line or heart catheterization. At this time, they have family coming in and states that they will be here within the next few hours. They do want us to give medications that will help keep the patient alive until family arrived. They stated that after family arrives and has a chance to see the patient, the plan is to become DNR CC with palliative extubation.

## 2020-12-30 LAB
EKG ATRIAL RATE: 125 BPM
EKG ATRIAL RATE: 74 BPM
EKG DIAGNOSIS: NORMAL
EKG DIAGNOSIS: NORMAL
EKG P AXIS: 42 DEGREES
EKG P AXIS: 93 DEGREES
EKG P-R INTERVAL: 168 MS
EKG P-R INTERVAL: 198 MS
EKG Q-T INTERVAL: 292 MS
EKG Q-T INTERVAL: 400 MS
EKG QRS DURATION: 132 MS
EKG QRS DURATION: 138 MS
EKG QTC CALCULATION (BAZETT): 421 MS
EKG QTC CALCULATION (BAZETT): 444 MS
EKG R AXIS: -1 DEGREES
EKG R AXIS: 14 DEGREES
EKG T AXIS: 140 DEGREES
EKG T AXIS: 150 DEGREES
EKG VENTRICULAR RATE: 125 BPM
EKG VENTRICULAR RATE: 74 BPM
HEMATOLOGY PATH CONSULT: NORMAL
